# Patient Record
Sex: MALE | Race: WHITE | NOT HISPANIC OR LATINO | URBAN - METROPOLITAN AREA
[De-identification: names, ages, dates, MRNs, and addresses within clinical notes are randomized per-mention and may not be internally consistent; named-entity substitution may affect disease eponyms.]

---

## 2017-09-22 ENCOUNTER — OUTPATIENT (OUTPATIENT)
Dept: OUTPATIENT SERVICES | Facility: HOSPITAL | Age: 62
LOS: 1 days | End: 2017-09-22
Payer: COMMERCIAL

## 2017-09-22 VITALS
WEIGHT: 244.93 LBS | DIASTOLIC BLOOD PRESSURE: 80 MMHG | HEART RATE: 84 BPM | SYSTOLIC BLOOD PRESSURE: 140 MMHG | HEIGHT: 67 IN | RESPIRATION RATE: 16 BRPM | TEMPERATURE: 99 F

## 2017-09-22 DIAGNOSIS — Z98.890 OTHER SPECIFIED POSTPROCEDURAL STATES: Chronic | ICD-10-CM

## 2017-09-22 DIAGNOSIS — E11.9 TYPE 2 DIABETES MELLITUS WITHOUT COMPLICATIONS: ICD-10-CM

## 2017-09-22 DIAGNOSIS — R94.31 ABNORMAL ELECTROCARDIOGRAM [ECG] [EKG]: ICD-10-CM

## 2017-09-22 DIAGNOSIS — L05.01 PILONIDAL CYST WITH ABSCESS: ICD-10-CM

## 2017-09-22 DIAGNOSIS — R06.83 SNORING: ICD-10-CM

## 2017-09-22 LAB
BUN SERPL-MCNC: 18 MG/DL — SIGNIFICANT CHANGE UP (ref 7–23)
CALCIUM SERPL-MCNC: 9.3 MG/DL — SIGNIFICANT CHANGE UP (ref 8.4–10.5)
CHLORIDE SERPL-SCNC: 100 MMOL/L — SIGNIFICANT CHANGE UP (ref 98–107)
CO2 SERPL-SCNC: 23 MMOL/L — SIGNIFICANT CHANGE UP (ref 22–31)
CREAT SERPL-MCNC: 0.7 MG/DL — SIGNIFICANT CHANGE UP (ref 0.5–1.3)
GLUCOSE SERPL-MCNC: 114 MG/DL — HIGH (ref 70–99)
HBA1C BLD-MCNC: 7.1 % — HIGH (ref 4–5.6)
HCT VFR BLD CALC: 48.9 % — SIGNIFICANT CHANGE UP (ref 39–50)
HGB BLD-MCNC: 15.9 G/DL — SIGNIFICANT CHANGE UP (ref 13–17)
MCHC RBC-ENTMCNC: 30.7 PG — SIGNIFICANT CHANGE UP (ref 27–34)
MCHC RBC-ENTMCNC: 32.5 % — SIGNIFICANT CHANGE UP (ref 32–36)
MCV RBC AUTO: 94.4 FL — SIGNIFICANT CHANGE UP (ref 80–100)
NRBC # FLD: 0 — SIGNIFICANT CHANGE UP
PLATELET # BLD AUTO: 203 K/UL — SIGNIFICANT CHANGE UP (ref 150–400)
PMV BLD: 10.1 FL — SIGNIFICANT CHANGE UP (ref 7–13)
POTASSIUM SERPL-MCNC: 3.9 MMOL/L — SIGNIFICANT CHANGE UP (ref 3.5–5.3)
POTASSIUM SERPL-SCNC: 3.9 MMOL/L — SIGNIFICANT CHANGE UP (ref 3.5–5.3)
RBC # BLD: 5.18 M/UL — SIGNIFICANT CHANGE UP (ref 4.2–5.8)
RBC # FLD: 13.3 % — SIGNIFICANT CHANGE UP (ref 10.3–14.5)
SODIUM SERPL-SCNC: 140 MMOL/L — SIGNIFICANT CHANGE UP (ref 135–145)
WBC # BLD: 8.96 K/UL — SIGNIFICANT CHANGE UP (ref 3.8–10.5)
WBC # FLD AUTO: 8.96 K/UL — SIGNIFICANT CHANGE UP (ref 3.8–10.5)

## 2017-09-22 PROCEDURE — 93010 ELECTROCARDIOGRAM REPORT: CPT

## 2017-09-22 NOTE — H&P PST ADULT - PROBLEM SELECTOR PLAN 2
Abnormal EKG, with left axis deviation  Pt advised to obtain medical clearance prior to surgery - will obtain copy.  Pt on aspirin, advised pt to stop aspirin on 10/4/17

## 2017-09-22 NOTE — H&P PST ADULT - HISTORY OF PRESENT ILLNESS
61 yrs old male with h/o pilonidal cyst , had surgery for it 6 yrs ago, now again with pilonidal cyst presents for preop eval to have excision of pilonidal cyst on 10/11/17. Pt stated that it started > 2 months ago and had abscess that has been draining pustular drainage.

## 2017-09-22 NOTE — H&P PST ADULT - PROBLEM SELECTOR PLAN 3
a1c done and will order FS on the day of surgery,  advised pt to stop metformin after am dose on 10/10/17, and stop Invokana after am dose on 10/8/17,  pt advised not to take any diabetic meds on the day fos surgery

## 2017-09-22 NOTE — H&P PST ADULT - PSH
Deviated Septum repair  21 yrs ago  History of colonoscopy    History of excision of pilonidal cyst  2011

## 2017-09-22 NOTE — H&P PST ADULT - NEGATIVE ENMT SYMPTOMS
no nasal obstruction/no post-nasal discharge/no nose bleeds/no abnormal taste sensation/no dry mouth/no hearing difficulty/no sinus symptoms/no ear pain/no tinnitus/no throat pain/no nasal discharge/no recurrent cold sores/no dysphagia/no vertigo/no nasal congestion

## 2017-09-22 NOTE — H&P PST ADULT - PMH
BPH (Benign Prostatic Hypertrophy)    Deviated nasal septum    Diabetes Mellitus Type II    Hyperlipemia    Hypertension    Obesity (BMI 30-39.9)    Pilonidal cyst with abscess    Urticaria pigmentosa, adult form

## 2017-09-22 NOTE — H&P PST ADULT - NEGATIVE NEUROLOGICAL SYMPTOMS
no tremors/no vertigo/no transient paralysis/no weakness/no loss of sensation/no paresthesias/no difficulty walking

## 2017-09-22 NOTE — H&P PST ADULT - NSANTHOSAYNRD_GEN_A_CORE
never tested/No. SHELL screening performed.  STOP BANG Legend: 0-2 = LOW Risk; 3-4 = INTERMEDIATE Risk; 5-8 = HIGH Risk

## 2017-09-22 NOTE — H&P PST ADULT - PROBLEM SELECTOR PLAN 1
Scheduled for excision of pilonidal cyst on 10/11/17.   labs pending,  EKG in chart,  Preop instructions provided to pt. Scheduled for excision of pilonidal cyst on 10/11/17.   labs pending,  EKG in chart,  preop instructions provided to pt.  Preop instructions provided to pt.

## 2017-10-11 ENCOUNTER — OUTPATIENT (OUTPATIENT)
Dept: OUTPATIENT SERVICES | Facility: HOSPITAL | Age: 62
LOS: 1 days | Discharge: HOME | End: 2017-10-11
Payer: COMMERCIAL

## 2017-10-11 ENCOUNTER — RESULT REVIEW (OUTPATIENT)
Age: 62
End: 2017-10-11

## 2017-10-11 VITALS
HEIGHT: 67 IN | WEIGHT: 244.93 LBS | RESPIRATION RATE: 16 BRPM | DIASTOLIC BLOOD PRESSURE: 80 MMHG | HEART RATE: 84 BPM | SYSTOLIC BLOOD PRESSURE: 140 MMHG | OXYGEN SATURATION: 98 % | TEMPERATURE: 99 F

## 2017-10-11 VITALS
SYSTOLIC BLOOD PRESSURE: 110 MMHG | OXYGEN SATURATION: 98 % | HEART RATE: 80 BPM | RESPIRATION RATE: 12 BRPM | DIASTOLIC BLOOD PRESSURE: 60 MMHG

## 2017-10-11 DIAGNOSIS — Z98.890 OTHER SPECIFIED POSTPROCEDURAL STATES: Chronic | ICD-10-CM

## 2017-10-11 DIAGNOSIS — L05.01 PILONIDAL CYST WITH ABSCESS: ICD-10-CM

## 2017-10-11 PROCEDURE — 88304 TISSUE EXAM BY PATHOLOGIST: CPT | Mod: 26

## 2017-10-11 NOTE — ASU DISCHARGE PLAN (ADULT/PEDIATRIC). - NOTIFY
Inability to Tolerate Liquids or Foods/Fever greater than 101/Persistent Nausea and Vomiting/Unable to Urinate/Bleeding that does not stop/Swelling that continues/Pain not relieved by Medications

## 2017-10-11 NOTE — BRIEF OPERATIVE NOTE - PROCEDURE
<<-----Click on this checkbox to enter Procedure Excision of pilonidal cyst  10/11/2017    Active  [id number]

## 2017-10-12 ENCOUNTER — TRANSCRIPTION ENCOUNTER (OUTPATIENT)
Age: 62
End: 2017-10-12

## 2024-03-11 PROBLEM — J34.2 DEVIATED NASAL SEPTUM: Chronic | Status: ACTIVE | Noted: 2017-09-22

## 2024-03-11 PROBLEM — Q82.2 CONGENITAL CUTANEOUS MASTOCYTOSIS: Chronic | Status: ACTIVE | Noted: 2017-09-22

## 2024-03-11 PROBLEM — E66.9 OBESITY, UNSPECIFIED: Chronic | Status: ACTIVE | Noted: 2017-09-22

## 2024-03-11 PROBLEM — L05.01 PILONIDAL CYST WITH ABSCESS: Chronic | Status: ACTIVE | Noted: 2017-09-22
